# Patient Record
Sex: MALE | URBAN - METROPOLITAN AREA
[De-identification: names, ages, dates, MRNs, and addresses within clinical notes are randomized per-mention and may not be internally consistent; named-entity substitution may affect disease eponyms.]

---

## 2024-10-11 ENCOUNTER — TELEPHONE (OUTPATIENT)
Dept: SURGERY | Age: 44
End: 2024-10-11

## 2024-10-11 NOTE — TELEPHONE ENCOUNTER
Brayan Ramesh     1980        male    [address]  [facility] [address]                    Legal Guardian No  If yes, Name:       Skilled Facility No     If yes, Name:                                             Home Phone: [phone]         Cell Phone:    No relevant phone numbers on file.                                           Surgeon: Darby Surgery Date: 2/21/25                        Procedure: Colonoscopy  Duration:    Diagnosis: Screening   CPT Codes:    Important Medical History:  In Epic    First Assistant   Special Inst/Equip/Implants: Regular    Nickel allergy  No  Latex Allergy: No      Cardiac Device:  No  If yes, need most recent pacemaker interrogation from Cardiologist:  Type of pacemaker:    Anesthesia:    MAC                       Admission Type:  Same Day                        Admit Prior to Day of Surgery: No    Case Location:  Ambulatory            Preadmission Testing:  Phone Call             PAT Date and Time:     Need Preop Cardiac Clearance:   Need Pre-op/Medical Clearance:    Does Patient have Cardiologist/physician? Name of Physician:        Special Needs Communication:  Damaris Lift No    needed No

## 2024-10-11 NOTE — TELEPHONE ENCOUNTER
[facility] Mail In Colonoscopy Worksheet    Patient Name: Brayan Ramesh  : 1980  Primary Care Physician: Di Alamo APRN - NP  Today's Date: 10/11/2024    Surgery Location:   []David  [x] [facility]la [] Other    Why has a Colonoscopy been recommended for you?   - Screening      Are you currently having any of the following symptoms?  [] Rectal Bleeding (K62.5) [] Bloody Stool (K92.1) [] Dark Tarry Stool (K92.1)  [] Fecal Occult Positive Blood (confirmed by blood test R19.5)  [] Anemia (low hemoglobin D64.9) [] Abdominal Pain (R10.84) [] Diarrhea (R19.7)    Have had a colonoscopy before?  [] Yes  [x] No    If so, where and when was that done?   -     Was anything found during the last scope?  -     Do you have a family history of colon cancer?   - No    No family history on file.      How much caffeine do you drink in a day?   -     Any tobacco use?    [] Yes    [] No    Any alcohol use?     [] Yes    [] No  If yes, how often?       In the last six (6) months have you experienced any of the following symptoms?   [] Blood from the rectum or stool  [] Abdominal Pain   [] Diarrhea  [] Itching of rectum   [] Vomiting  [] Constipation   [] Black stools  [] Bloating   [] Mucous in your stool  [] [place]   [] Change in bowel habits    Do you have any medication allergies?   [] Yes  If yes, please list:   [x] No    Do you take Warfarin, Coumadin, Plavix, Eliquis, Xarelto, or aspirin OR do you take a medication that thins your blood?  [] Yes  [x] No    Medications:    No current outpatient medications on file prior to visit.     No current facility-administered medications on file prior to visit.         Surgical History:    No past surgical history on file.      Medical History:     No past medical history on file.       List the medical problems you are currently being treated for:                                 Reviewed by nurse: SHOBHA      SURGERY SCHEDULED FOR 25      ADDITIONAL NOTES:

## 2025-02-14 ENCOUNTER — TELEPHONE (OUTPATIENT)
Dept: PREADMISSION TESTING | Age: 45
End: 2025-02-14

## 2025-02-14 NOTE — TELEPHONE ENCOUNTER
Patient is scheduled on 2/21 with Dr Pastor for a colonoscopy. He stated he has not received his colon prep instructions. I did give him the office phone number, but can you reach out to patient so he can get those instructions? Thank you.

## 2025-02-17 NOTE — TELEPHONE ENCOUNTER
Contacted patient, I have given and reviewed all prep instructions via email per patient request.

## 2025-05-29 NOTE — PROGRESS NOTES
Patient states they received their colon prep instructions and home medications that are to be taken on the day of their procedure with a small sip of water only, from the physician's office. Patient reminded no alcohol within 24 hours of procedure. No tobacco after midnight the night before. No marijuana use within 5 days of procedure. Patient verbalized understanding.

## 2025-06-12 ENCOUNTER — ANESTHESIA EVENT (OUTPATIENT)
Dept: OPERATING ROOM | Age: 45
End: 2025-06-12
Payer: COMMERCIAL

## 2025-06-13 ENCOUNTER — ANESTHESIA (OUTPATIENT)
Dept: OPERATING ROOM | Age: 45
End: 2025-06-13
Payer: COMMERCIAL

## 2025-06-13 ENCOUNTER — HOSPITAL ENCOUNTER (OUTPATIENT)
Age: 45
Setting detail: OUTPATIENT SURGERY
Discharge: HOME | End: 2025-06-13
Attending: SURGERY | Admitting: SURGERY
Payer: COMMERCIAL

## 2025-06-13 VITALS
DIASTOLIC BLOOD PRESSURE: 66 MMHG | TEMPERATURE: 97.2 F | BODY MASS INDEX: 25.64 KG/M2 | HEART RATE: 59 BPM | HEIGHT: 64 IN | OXYGEN SATURATION: 97 % | SYSTOLIC BLOOD PRESSURE: 108 MMHG | WEIGHT: 150.2 LBS | RESPIRATION RATE: 12 BRPM

## 2025-06-13 PROCEDURE — 2709999900 HC NON-CHARGEABLE SUPPLY: Performed by: SURGERY

## 2025-06-13 PROCEDURE — 3700000001 HC ADD 15 MINUTES (ANESTHESIA): Performed by: SURGERY

## 2025-06-13 PROCEDURE — 6360000002 HC RX W HCPCS

## 2025-06-13 PROCEDURE — 45378 DIAGNOSTIC COLONOSCOPY: CPT | Performed by: SURGERY

## 2025-06-13 PROCEDURE — 3700000000 HC ANESTHESIA ATTENDED CARE: Performed by: SURGERY

## 2025-06-13 PROCEDURE — 3609027000 HC COLONOSCOPY: Performed by: SURGERY

## 2025-06-13 PROCEDURE — 7100000011 HC PHASE II RECOVERY - ADDTL 15 MIN: Performed by: SURGERY

## 2025-06-13 PROCEDURE — 7100000010 HC PHASE II RECOVERY - FIRST 15 MIN: Performed by: SURGERY

## 2025-06-13 PROCEDURE — 2580000003 HC RX 258: Performed by: NURSE ANESTHETIST, CERTIFIED REGISTERED

## 2025-06-13 RX ORDER — LIDOCAINE HYDROCHLORIDE 20 MG/ML
INJECTION, SOLUTION EPIDURAL; INFILTRATION; INTRACAUDAL; PERINEURAL
Status: DISCONTINUED | OUTPATIENT
Start: 2025-06-13 | End: 2025-06-13 | Stop reason: SDUPTHER

## 2025-06-13 RX ORDER — ONDANSETRON 2 MG/ML
4 INJECTION INTRAMUSCULAR; INTRAVENOUS
Status: DISCONTINUED | OUTPATIENT
Start: 2025-06-13 | End: 2025-06-13 | Stop reason: HOSPADM

## 2025-06-13 RX ORDER — SODIUM CHLORIDE 9 MG/ML
INJECTION, SOLUTION INTRAVENOUS PRN
Status: DISCONTINUED | OUTPATIENT
Start: 2025-06-13 | End: 2025-06-13 | Stop reason: HOSPADM

## 2025-06-13 RX ORDER — PROPOFOL 10 MG/ML
INJECTION, EMULSION INTRAVENOUS
Status: DISCONTINUED | OUTPATIENT
Start: 2025-06-13 | End: 2025-06-13 | Stop reason: SDUPTHER

## 2025-06-13 RX ORDER — SODIUM CHLORIDE 0.9 % (FLUSH) 0.9 %
5-40 SYRINGE (ML) INJECTION EVERY 12 HOURS SCHEDULED
Status: DISCONTINUED | OUTPATIENT
Start: 2025-06-13 | End: 2025-06-13 | Stop reason: HOSPADM

## 2025-06-13 RX ORDER — NALOXONE HYDROCHLORIDE 0.4 MG/ML
INJECTION, SOLUTION INTRAMUSCULAR; INTRAVENOUS; SUBCUTANEOUS PRN
Status: DISCONTINUED | OUTPATIENT
Start: 2025-06-13 | End: 2025-06-13 | Stop reason: HOSPADM

## 2025-06-13 RX ORDER — SODIUM CHLORIDE 0.9 % (FLUSH) 0.9 %
5-40 SYRINGE (ML) INJECTION PRN
Status: DISCONTINUED | OUTPATIENT
Start: 2025-06-13 | End: 2025-06-13 | Stop reason: HOSPADM

## 2025-06-13 RX ORDER — SODIUM CHLORIDE, SODIUM LACTATE, POTASSIUM CHLORIDE, CALCIUM CHLORIDE 600; 310; 30; 20 MG/100ML; MG/100ML; MG/100ML; MG/100ML
INJECTION, SOLUTION INTRAVENOUS CONTINUOUS
Status: DISCONTINUED | OUTPATIENT
Start: 2025-06-13 | End: 2025-06-13 | Stop reason: HOSPADM

## 2025-06-13 RX ADMIN — LIDOCAINE HYDROCHLORIDE 60 MG: 20 INJECTION, SOLUTION EPIDURAL; INFILTRATION; INTRACAUDAL; PERINEURAL at 10:45

## 2025-06-13 RX ADMIN — PROPOFOL 30 MG: 10 INJECTION, EMULSION INTRAVENOUS at 10:54

## 2025-06-13 RX ADMIN — PROPOFOL 40 MG: 10 INJECTION, EMULSION INTRAVENOUS at 10:58

## 2025-06-13 RX ADMIN — SODIUM CHLORIDE, SODIUM LACTATE, POTASSIUM CHLORIDE, AND CALCIUM CHLORIDE: .6; .31; .03; .02 INJECTION, SOLUTION INTRAVENOUS at 09:54

## 2025-06-13 RX ADMIN — PROPOFOL 70 MG: 10 INJECTION, EMULSION INTRAVENOUS at 10:46

## 2025-06-13 RX ADMIN — PROPOFOL 180 MCG/KG/MIN: 10 INJECTION, EMULSION INTRAVENOUS at 10:45

## 2025-06-13 RX ADMIN — Medication 0.1 MG: at 10:56

## 2025-06-13 ASSESSMENT — LIFESTYLE VARIABLES: SMOKING_STATUS: 0

## 2025-06-13 ASSESSMENT — PAIN - FUNCTIONAL ASSESSMENT
PAIN_FUNCTIONAL_ASSESSMENT: 0-10
PAIN_FUNCTIONAL_ASSESSMENT: NONE - DENIES PAIN

## 2025-06-13 NOTE — OP NOTE
Operative Note      Patient: Brayan Ramesh  YOB: 1980  MRN: [id number]  Di Alamo, APRN - NP    Date of Procedure: 6/13/2025    Pre-Op Diagnosis Codes:      * Screening for colon cancer [Z12.11]    Post-Op Diagnosis: normal healthy colon       Procedure:Colonoscopy to cecum

## 2025-06-13 NOTE — H&P
GENERAL SURGERY CONSULTATION      Patient's Name/ Date of Birth/ Gender: Brayan Ramesh / 1980 (44 y.o.) / male     PCP: Di Alamo APRN - NP  Referring:     History of present Illness:  Patient is a pleasant 44 y.o. male  kindly referred by Di Alamo APRN - NP    Past Medical History:  has no past medical history on file.    Past Surgical History:   Past Surgical History:   Procedure Laterality Date    WISDOM TOOTH EXTRACTION  2005       Social History:  reports that he has never smoked. His smokeless tobacco use includes chew. He reports current alcohol use. He reports that he does not currently use drugs.    Family History: family history is not on file.    Review of Systems:   General: Completed and, except as mentioned above, was negative or noncontributory  Psychological:  Completed and, except as mentioned above, was negative or noncontributory  Ophthalmic:  Completed and, except as mentioned above, was negative or noncontributory  ENT:  Completed and, except as mentioned above, was negative or noncontributory  Allergy and Immunology:  Completed and, except as mentioned above, was negative or noncontributory  Hematological and Lymphatic:  Completed and, except as mentioned above, was negative or noncontributory  Endocrine: Completed and, except as mentioned above, was negative or noncontributory  Breast:  Completed and, except as mentioned above, was negative or noncontributory  Respiratory:  Completed and, except as mentioned above, was negative or noncontributory  Cardiovascular:  Completed and, except as mentioned above, was negative or noncontributory  Gastrointestinal: Completed and, except as mentioned above, was negative or noncontributory  Genito-Urinary:  Completed and, except as mentioned above, was negative or noncontributory  Musculoskeletal:  Completed and, except as mentioned above, was negative or noncontributory  Neurological:  Completed and, except as mentioned  above, was negative or noncontributory  Dermatological:  Completed and, except as mentioned above, was negative or noncontributory    Allergies: Patient has no known allergies.    Current Meds:  Current Facility-Administered Medications:     lactated ringers infusion, , IntraVENous, Continuous, Jennifer Woods, PAUL - REMBERTO    Physical Exam:  Vital signs and Nurse's note reviewed. Ht 1.626 m (5' 4\")   Wt 68.1 kg (150 lb 3.2 oz)   BMI 25.78 kg/m²    height is 1.626 m (5' 4\") and weight is 68.1 kg (150 lb 3.2 oz).   Gen:  A&Ox3, NAD. Pleasant and cooperative.  HEENT: PERRLA, EOMI, no scleral icterus  Neck:  no goiter  CVS: Regular rate and rhythm  Resp: Good bilateral air entry, no active wheezing, no labored breathing  Abd: soft, non-tender, non-distended, bowel sounds present   Ext: Moves all extremities, no gross focal motor deficits  Skin: No erythema or ulcerations     Labs:   No results found for: \"WBC\", \"HGB\", \"HCT\", \"MCV\", \"PLT\"  No results found for: \"NA\", \"K\", \"CL\", \"CO2\", \"BUN\", \"CREATININE\", \"GLUCOSE\", \"CALCIUM\"  No results found for: \"ALKPHOS\", \"ALT\", \"AST\", \"BILITOT\", \"BILIDIR\", \"LABALBU\"  No results found for: \"AMYLASE\"  No results found for: \"LIPASE\"  No results found for: \"INR\"    Radiologic Studies:      Impressions/Recommendations:     Screening colonoscopy  Risks and benefits of colonoscopy have been discussed in detail with the patient.  Risks of the procedure include bleeding, bowel perforation, possibly missing smaller lesions if the bowel prep is not adequate. Alternative studies include barium enema and virtual colonoscopy; however, if a lesion is seen, then one would still need to proceed with the gold standard colonoscopy to retrieve or biopsy the lesion.  All the patient's questions are answered. Will proceed with colonoscopy under MAC.     H&P  General Surgery        Pt Name: Brayan Ramesh  MRN: [id number]  YOB: 1980  Date of evaluation: 6/13/2025      [x] I have examined

## 2025-06-13 NOTE — ANESTHESIA POSTPROCEDURE EVALUATION
Department of Anesthesiology  Postprocedure Note    Patient: Brayan Ramesh  MRN: [id number]  YOB: 1980  Date of evaluation: 6/13/2025    Procedure Summary       Date: 06/13/25 Room / Location: [place] [address] / [facility]    Anesthesia Start: 1042 Anesthesia Stop: 1118    Procedure: COLORECTAL CANCER SCREENING, NOT HIGH RISK Diagnosis:       Screening for colon cancer      (Screening for colon cancer [Z12.11])    Surgeons: Lauren Pastor DO Responsible Provider: Claudia Bush APRN - CRNA    Anesthesia Type: General, TIVA ASA Status: 2            Anesthesia Type: General, TIVA    Caleb Phase I: Caleb Score: 10    Caleb Phase II: Caleb Score: 10    Anesthesia Post Evaluation    Patient location during evaluation: PACU  Patient participation: complete - patient participated  Level of consciousness: awake and alert  Pain score: 0  Airway patency: patent  Nausea & Vomiting: no nausea and no vomiting  Cardiovascular status: blood pressure returned to baseline and hemodynamically stable  Respiratory status: acceptable, nonlabored ventilation and spontaneous ventilation  Hydration status: euvolemic  Pain management: adequate    No notable events documented.

## (undated) DEVICE — CANNULA ORAL NSL AD CO2 N INTUB O2 DEL DISP TRU LNK

## (undated) DEVICE — TUBING SUCT NON-STRL 9/32X100 W/CNNT

## (undated) DEVICE — SOLUTION IRRIG 1000ML 0.9% SOD CHL USP POUR PLAS BTL

## (undated) DEVICE — ENDOSCOPIC KIT 1.1+ 10 FT OP4 CA DE 2 GWN AAMI LEVEL 3